# Patient Record
Sex: FEMALE | Race: AMERICAN INDIAN OR ALASKA NATIVE
[De-identification: names, ages, dates, MRNs, and addresses within clinical notes are randomized per-mention and may not be internally consistent; named-entity substitution may affect disease eponyms.]

---

## 2019-06-16 ENCOUNTER — HOSPITAL ENCOUNTER (EMERGENCY)
Dept: HOSPITAL 5 - ED | Age: 64
Discharge: HOME | End: 2019-06-16
Payer: SELF-PAY

## 2019-06-16 VITALS — DIASTOLIC BLOOD PRESSURE: 90 MMHG | SYSTOLIC BLOOD PRESSURE: 145 MMHG

## 2019-06-16 DIAGNOSIS — E78.00: ICD-10-CM

## 2019-06-16 DIAGNOSIS — I10: Primary | ICD-10-CM

## 2019-06-16 DIAGNOSIS — Z90.710: ICD-10-CM

## 2019-06-16 DIAGNOSIS — E87.6: ICD-10-CM

## 2019-06-16 LAB
BASOPHILS # (AUTO): 0 K/MM3 (ref 0–0.1)
BASOPHILS NFR BLD AUTO: 0.7 % (ref 0–1.8)
BUN SERPL-MCNC: 15 MG/DL (ref 7–17)
BUN/CREAT SERPL: 14 %
CALCIUM SERPL-MCNC: 9.5 MG/DL (ref 8.4–10.2)
EOSINOPHIL # BLD AUTO: 0.2 K/MM3 (ref 0–0.4)
EOSINOPHIL NFR BLD AUTO: 5 % (ref 0–4.3)
HCT VFR BLD CALC: 44.2 % (ref 30.3–42.9)
HEMOLYSIS INDEX: 5
HGB BLD-MCNC: 14.6 GM/DL (ref 10.1–14.3)
LYMPHOCYTES # BLD AUTO: 1.2 K/MM3 (ref 1.2–5.4)
LYMPHOCYTES NFR BLD AUTO: 26.8 % (ref 13.4–35)
MCHC RBC AUTO-ENTMCNC: 33 % (ref 30–34)
MCV RBC AUTO: 85 FL (ref 79–97)
MONOCYTES # (AUTO): 0.5 K/MM3 (ref 0–0.8)
MONOCYTES % (AUTO): 10.4 % (ref 0–7.3)
PLATELET # BLD: 292 K/MM3 (ref 140–440)
RBC # BLD AUTO: 5.19 M/MM3 (ref 3.65–5.03)

## 2019-06-16 PROCEDURE — 80048 BASIC METABOLIC PNL TOTAL CA: CPT

## 2019-06-16 PROCEDURE — 93005 ELECTROCARDIOGRAM TRACING: CPT

## 2019-06-16 PROCEDURE — 93010 ELECTROCARDIOGRAM REPORT: CPT

## 2019-06-16 PROCEDURE — 85025 COMPLETE CBC W/AUTO DIFF WBC: CPT

## 2019-06-16 PROCEDURE — 36415 COLL VENOUS BLD VENIPUNCTURE: CPT

## 2019-06-16 NOTE — EVENT NOTE
ED Screening Note


Date of service: 06/16/19


Time: 14:21


ED Screening Note: 


62 y/o female c/o to er for dizziness and checked her blood pressure it was 

elevated.  Took her medication Atenolol 100 mg and Maxzide 37.5/25mg .  She is 

on Lovastatin 20 mg qhs.  Denies any CP, SOB, or HA. 





This initial assessment/diagnostic orders/clinical plan/treatment(s) is/are 

subject to change based on patients health status, clinical progression and re-

assessment by fellow clinical providers in the ED. Further treatment and workup 

at subsequent clinical providers discretion. Patient/guardian urged not to elope

from the ED as their condition may be serious if not clinically assessed and 

managed. 





Initial orders include:

## 2019-06-16 NOTE — EMERGENCY DEPARTMENT REPORT
ED General Adult HPI





- General


Chief complaint: High BP


Stated complaint: HBP


Time Seen by Provider: 06/16/19 16:05


Source: patient


Mode of arrival: Ambulatory


Limitations: No Limitations





- History of Present Illness


Initial comments: 





This is a 63-year-old female nontoxic, well nourished in appearance, no acute 

signs of distress presents to the ED with c/o of elevated high blood pressure.  

Patient stated that earlier today she took her blood pressure and it was 

elevated.  Patient denies any headache, dizziness, chest pain, shortness of 

breath, headache, stiff neck, numbness, tingling, back pain, or blurred vision. 

Patient states he does take her medication is atenolol and traiam/HCTZ.  Patient

agrees to having a primary care doctor that she follows and stated she will 

follow-up tomorrow.  Patient denies any drug allergies.


MD Complaint: hypertension


-: days(s) (1)


Radiation: non-radiation


Severity scale (0 -10): 0


Consistency: constant


Improves with: none


Worsens with: none


Associated Symptoms: denies: confusion, chest pain, cough, diaphoresis, 

fever/chills, headaches, loss of appetite, malaise, nausea/vomiting, rash, 

seizure, shortness of breath, syncope, weakness


Treatments Prior to Arrival: none





- Related Data


                                    Allergies











Allergy/AdvReac Type Severity Reaction Status Date / Time


 


No Known Allergies Allergy   Unverified 06/16/19 13:35














ED Review of Systems


ROS: 


Stated complaint: HBP


Other details as noted in HPI





Constitutional: denies: chills, fever


Eyes: denies: eye pain, eye discharge, vision change


ENT: denies: ear pain, throat pain


Respiratory: denies: cough, shortness of breath, wheezing


Cardiovascular: denies: chest pain, palpitations


Endocrine: no symptoms reported


Gastrointestinal: denies: abdominal pain, nausea, diarrhea


Genitourinary: denies: urgency, dysuria, discharge


Musculoskeletal: denies: back pain, joint swelling, arthralgia


Skin: denies: rash, lesions


Neurological: denies: headache, weakness, paresthesias


Psychiatric: denies: anxiety, depression


Hematological/Lymphatic: denies: easy bleeding, easy bruising





ED Past Medical Hx





- Past Medical History


Hx Hypertension: Yes


Additional medical history: elevated cholesterol





- Surgical History


Past Surgical History?: No


Additional Surgical History: hysterectomy





- Social History


Smoking Status: Never Smoker


Substance Use Type: None





ED Physical Exam





- General


Limitations: No Limitations


General appearance: alert, in no apparent distress





- Head


Head exam: Present: atraumatic, normocephalic





- Eye


Eye exam: Present: normal appearance, PERRL, EOMI





- Neck


Neck exam: Present: normal inspection, full ROM.  Absent: tenderness, 

meningismus, lymphadenopathy





- Respiratory


Respiratory exam: Present: normal lung sounds bilaterally.  Absent: respiratory 

distress, wheezes, rales, rhonchi, stridor, chest wall tenderness, accessory 

muscle use, decreased breath sounds, prolonged expiratory





- Cardiovascular


Cardiovascular Exam: Present: regular rate, normal rhythm, normal heart sounds. 

Absent: bradycardia, tachycardia, irregular rhythm, systolic murmur, diastolic 

murmur, rubs, gallop





- Extremities Exam


Extremities exam: Present: normal inspection, full ROM





- Back Exam


Back exam: Present: normal inspection, full ROM.  Absent: tenderness, CVA 

tenderness (R), CVA tenderness (L), muscle spasm, paraspinal tenderness, 

vertebral tenderness, rash noted





- Neurological Exam


Neurological exam: Present: alert, oriented X3, normal gait





- Psychiatric


Psychiatric exam: Present: normal affect, normal mood





- Skin


Skin exam: Present: warm, dry, intact, normal color.  Absent: rash





ED Course





                                   Vital Signs











  06/16/19 06/16/19 06/16/19





  13:46 16:24 16:27


 


Temperature 98 F 98.3 F 


 


Pulse Rate 83 75 75


 


Respiratory 18 17 





Rate   


 


Blood Pressure 212/119  194/111


 


Blood Pressure  194/111 





[Left]   


 


O2 Sat by Pulse 100 100 





Oximetry   














- Reevaluation(s)


Reevaluation #1: 





06/16/19 17:14


Patient is speaking in full sentences with no signs of distress noted.





ED Medical Decision Making





- Lab Data


Result diagrams: 


                                 06/16/19 14:55





                                 06/16/19 14:55





- Medical Decision Making





This is a 63-year-old female that presents with uncontrolled hypertension and 

hypokalemia.  Patient is stable and was examined by me.  Labs are unremarkable. 

 Patient did receive Catapres in the ER and blood pressure has decreased prior 

to discharge.  Patient is asymptomatic.  Patient was instructed to keep a diary 

of blood pressure and to present to primary care doctor.  She did state that she

 has an appointment tomorrow with her primary care doctor.  Patient received 

potassium chloride 40 MEQ.  EKG normal sinus rhythm with no ST T abnormalities. 

 Patient was referred to Follow-up with a primary care doctor in 2 days or if 

symptoms worsen and continue return to emergency room as soon as possible.  At 

time of discharge, the patient does not seem toxic or ill in appearance.  No 

acute signs of distress noted.  Patient agrees to discharge treatment plan of 

care.  No further questions noted by the patient.


Critical care attestation.: 


If time is entered above; I have spent that time in minutes in the direct care 

of this critically ill patient, excluding procedure time.








ED Disposition


Clinical Impression: 


 Uncontrolled hypertension, Hypokalemia





Disposition: DC-01 TO HOME OR SELFCARE


Is pt being admited?: No


Does the pt Need Aspirin: No


Condition: Stable


Instructions:  Hypertension (ED), Hypokalemia (ED)


Additional Instructions: 


Follow-up with a primary care doctor in 2 days or if symptoms worsen and 

continue return to emergency room as soon as possible. 


Keep a daily diary of your blood pressure and presented to primary care doctor.





Referrals: 


[facility] [Primary Care Provider] - 3-5 Days


PRIMARY CARE,MD [Referring] - 3-5 Days


VICKI ROSA MD [Staff Physician] - 3-5 Days


[facility] [Outside] - 3-5 Days


[facility] [Outside] - 3-5 Days


Forms:  Work/School Release Form(ED)

## 2022-01-23 ENCOUNTER — HOSPITAL ENCOUNTER (EMERGENCY)
Dept: HOSPITAL 5 - ED | Age: 67
Discharge: HOME | End: 2022-01-23
Payer: SELF-PAY

## 2022-01-23 VITALS — SYSTOLIC BLOOD PRESSURE: 185 MMHG | DIASTOLIC BLOOD PRESSURE: 97 MMHG

## 2022-01-23 DIAGNOSIS — U07.1: Primary | ICD-10-CM

## 2022-01-23 DIAGNOSIS — E78.5: ICD-10-CM

## 2022-01-23 DIAGNOSIS — I10: ICD-10-CM

## 2022-01-23 PROCEDURE — 93010 ELECTROCARDIOGRAM REPORT: CPT

## 2022-01-23 PROCEDURE — 99282 EMERGENCY DEPT VISIT SF MDM: CPT

## 2022-01-23 PROCEDURE — 93005 ELECTROCARDIOGRAM TRACING: CPT

## 2022-01-23 NOTE — EMERGENCY DEPARTMENT REPORT
ED General Adult HPI





- General


Chief complaint: High BP


Stated complaint: HBP COUGH


PUI?: Yes


Time Seen by Provider: 01/23/22 08:34


Source: patient


Mode of arrival: Ambulatory


Limitations: No Limitations





- History of Present Illness


Initial comments: 





Chief complaint: "I am just feeling bad."





HPI: This 66-year-old female with history of hypertension, hyperlipidemia who 

presents with generalized malaise.  3 weeks ago she was diagnosed with COVID-19.

 Subsequent tests was negative.  Her mother recently diagnosed with influenza.  

Her father was hospitalized with COVID.  She has persistent cough and 

generalized fatigue.  She has missed 2 doses of amlodipine.  She needs refill of

amlodipine.  She has refill of atenolol and lovastatin.  She receives medical 

care at a women's clinic in [facility].  She denies fever, chest pain, shortness 

of breath, leg pain.


-: Gradual, week(s) (3 weeks of generalized malaise)


Consistency: constant


Improves with: none


Worsens with: none


Associated Symptoms: cough, malaise, other (Fatigue)





- Related Data


                                  Previous Rx's











 Medication  Instructions  Recorded  Last Taken  Type


 


Valsartan 80 mg PO DAILY 30 Days #30 01/23/22 Unknown Rx


 


amLODIPine 10 mg PO DAILY 30 Days #30 tab 01/23/22 Unknown Rx











                                    Allergies











Allergy/AdvReac Type Severity Reaction Status Date / Time


 


No Known Allergies Allergy   Verified 01/23/22 08:37














ED Review of Systems


ROS: 


Stated complaint: HBP COUGH


Other details as noted in HPI





Comment: All other systems reviewed and negative


Constitutional: denies: fever, malaise


Eyes: denies: as per HPI


Respiratory: cough.  denies: shortness of breath


Cardiovascular: denies: chest pain


Gastrointestinal: denies: abdominal pain, nausea, vomiting


Neurological: denies: headache





ED Past Medical Hx





- Past Medical History


Previous Medical History?: Yes


Hx Hypertension: Yes


Additional medical history: Hyperlipidemia





- Surgical History


Past Surgical History?: Yes


Additional Surgical History: hysterectomy, bilateral hernia repair





- Family History


Family history: hypertension, renal disease





- Social History


Smoking Status: Never Smoker


Substance Use Type: None


Other Social History: 





Occupation: 





- Medications


Home Medications: 


                                Home Medications











 Medication  Instructions  Recorded  Confirmed  Last Taken  Type


 


Valsartan 80 mg PO DAILY 30 Days #30 01/23/22  Unknown Rx


 


amLODIPine 10 mg PO DAILY 30 Days #30 tab 01/23/22  Unknown Rx














ED Physical Exam





- General


Limitations: No Limitations


General appearance: alert, in no apparent distress, other (Well-appearing, 

appears healthy appears comfortable)





- Head


Head exam: Present: atraumatic, normocephalic





- Eye


Eye exam: Present: normal appearance





- ENT


ENT exam: Present: mucous membranes moist





- Neck


Neck exam: Present: normal inspection, full ROM





- Respiratory


Respiratory exam: Present: normal lung sounds bilaterally.  Absent: respiratory 

distress, wheezes, rales, rhonchi





- Cardiovascular


Cardiovascular Exam: Present: regular rate, normal rhythm, normal heart sounds. 

Absent: systolic murmur, diastolic murmur, rubs, gallop





- GI/Abdominal


GI/Abdominal exam: Present: soft, normal bowel sounds.  Absent: distended, 

tenderness, guarding, rebound





- Extremities Exam


Extremities exam: Present: normal inspection





- Back Exam


Back exam: Present: normal inspection





- Neurological Exam


Neurological exam: Present: alert, oriented X3





- Psychiatric


Psychiatric exam: Present: normal affect, normal mood





- Skin


Skin exam: Present: warm, dry, intact, normal color.  Absent: rash





ED Course





                                   Vital Signs











  01/23/22





  07:37


 


Temperature 98.3 F


 


Pulse Rate 69


 


Respiratory 16





Rate 


 


Blood Pressure 200/108





[Right] 


 


O2 Sat by Pulse 100





Oximetry 














ED Medical Decision Making





- Medical Decision Making





Hypertensive urgency due to missed doses of amlodipine.  First dose of valsartan

 p.o. given in emergency department I have replaced atenolol with valsartan.  

Without cardiac history, beta-blockers not indicated.  I have prescribed 

amlodipine and valsartan.  I encouraged her to discontinue atenolol.





COVID-19: Persistent fatigue and cough.  Recommended rest hydration.  Oxygen 

saturation 100% today.


Critical care attestation.: 


If time is entered above; I have spent that time in minutes in the direct care 

of this critically ill patient, excluding procedure time.








ED Disposition


Clinical Impression: 


 Hypertensive urgency, COVID-19





Disposition: 01 HOME / SELF CARE / HOMELESS


Is pt being admited?: No


Does the pt Need Aspirin: No


Condition: Stable


Instructions:  Managing Your Hypertension


Additional Instructions: 


Please replace atenolol with new medication valsartan.


Prescriptions: 


amLODIPine 10 mg PO DAILY 30 Days #30 tab


Valsartan 80 mg PO DAILY 30 Days #30


Referrals: 


PRIMARY CARE,MD [Primary Care Provider] - 3-5 Days


RC CAMARENA MD [Staff Physician] - 3-5 Days